# Patient Record
(demographics unavailable — no encounter records)

---

## 2025-03-21 NOTE — PHYSICAL EXAM
[Midline] : trachea located in midline position [Normal] : no rashes [Hearing Loss Right Only] : normal [Hearing Loss Left Only] : normal [FreeTextEntry8] : cerumen impaction removed via curettage [FreeTextEntry9] : cerumen removed via curettage

## 2025-03-21 NOTE — ASSESSMENT
[FreeTextEntry1] : Reviewed and reconciled medications, allergies, PMHx, PSHx, SocHx, FMHx.     physical exam: right ear: cerumen impaction removed  left ear: cerumen  removed  Inflamed turbinates   Procedure Detail: After discussion of risks and benefits, verbal consent was obtained. Using wax curette, suction cerumen impaction was removed from the bilateral external auditory canals. The tympanic membranes were visualized and revealed bilateral intact tympanic membranes with no effusion. The patient tolerated the procedure well. Patient notes resolution of otologic symptoms and that hearing is at baseline. Audiogram was deferred.     Plan: Follow up in 6- 12 months     Case discussed with Dr. Dewey

## 2025-03-21 NOTE — CONSULT LETTER
[Dear  ___] : Dear  [unfilled], [Courtesy Letter:] : I had the pleasure of seeing your patient, [unfilled], in my office today. [Please see my note below.] : Please see my note below. [Referral Closing:] : Thank you very much for seeing this patient.  If you have any questions, please do not hesitate to contact me. [Sincerely,] : Sincerely, [FreeTextEntry3] : Willy Torres PA-C

## 2025-03-21 NOTE — HISTORY OF PRESENT ILLNESS
[de-identified] :  Patient h/o snhl wears hearing aids presents for clogged ear sensation, decreased hearing both ears for the last couple weeks. SHe denies otalgia, otorrhea, or vertigo. Patient denies history of recurrent ear infections. Patient denies facial weakness or facial numbness. Patient denies fevers or chills. Patient denies family history of early onset hearing loss. Patient denies exposure to ototoxic medications. No known head trauma.

## 2025-07-24 NOTE — PHYSICAL EXAM
[de-identified] : CI AU [de-identified] : scarred AU [Normal] : mucosa is normal [Midline] : trachea located in midline position